# Patient Record
Sex: FEMALE | URBAN - METROPOLITAN AREA
[De-identification: names, ages, dates, MRNs, and addresses within clinical notes are randomized per-mention and may not be internally consistent; named-entity substitution may affect disease eponyms.]

---

## 2023-01-20 ENCOUNTER — OFFICE VISIT (OUTPATIENT)
Dept: URBAN - METROPOLITAN AREA CLINIC 30 | Facility: CLINIC | Age: 53
End: 2023-01-20
Payer: COMMERCIAL

## 2023-01-20 DIAGNOSIS — H40.013 OPEN ANGLE WITH BORDERLINE FINDINGS, LOW RISK, BILATERAL: Primary | ICD-10-CM

## 2023-01-20 DIAGNOSIS — H44.23 DEGENERATIVE MYOPIA, BILATERAL: ICD-10-CM

## 2023-01-20 DIAGNOSIS — H40.033 ANATOMICAL NARROW ANGLE, BILATERAL: ICD-10-CM

## 2023-01-20 PROCEDURE — 92020 GONIOSCOPY: CPT | Performed by: OPHTHALMOLOGY

## 2023-01-20 PROCEDURE — 92083 EXTENDED VISUAL FIELD XM: CPT | Performed by: OPHTHALMOLOGY

## 2023-01-20 PROCEDURE — 76514 ECHO EXAM OF EYE THICKNESS: CPT | Performed by: OPHTHALMOLOGY

## 2023-01-20 PROCEDURE — 92133 CPTRZD OPH DX IMG PST SGM ON: CPT | Performed by: OPHTHALMOLOGY

## 2023-01-20 PROCEDURE — 99204 OFFICE O/P NEW MOD 45 MIN: CPT | Performed by: OPHTHALMOLOGY

## 2023-01-20 ASSESSMENT — INTRAOCULAR PRESSURE
OD: 10
OS: 10

## 2023-01-20 NOTE — IMPRESSION/PLAN
Impression: Open angle with borderline findings, low risk, bilateral: H40.013. Plan: Reviewed outside notes and referral will order appropriate glaucoma testing. Glaucoma suspect Gonio : SS 2+       Pachs: [phone]    Today's IOP : 13/14   Baseline Target IOP under 21
(+) Fhx of Glaucoma: Maternal grandmother (possibly) Right eye is the better seeing eye HVF OU 1/20/23 C/D:  0.7x0.7 Tilted (Myopic change) OCT: 90/87 1/20/23 Pt denies Sulfa Allergy   // Pt denies Lung /Heart dx Plan :
1.  ONH with significant tilt and cupping is challenging to evaluate - most likely myopic change without associated glc 2. However, given thin Ks and suspicious discs - recommend annual exam with HVF and RNFL, DFE
3.  Return in 1 year for full exam with optometry - was being seen at [facility]